# Patient Record
Sex: MALE | Race: BLACK OR AFRICAN AMERICAN
[De-identification: names, ages, dates, MRNs, and addresses within clinical notes are randomized per-mention and may not be internally consistent; named-entity substitution may affect disease eponyms.]

---

## 2020-11-24 ENCOUNTER — HOSPITAL ENCOUNTER (OUTPATIENT)
Dept: HOSPITAL 92 - BICRAD | Age: 55
Discharge: HOME | End: 2020-11-24
Payer: COMMERCIAL

## 2020-11-24 DIAGNOSIS — Z02.71: Primary | ICD-10-CM

## 2020-11-24 PROCEDURE — 71046 X-RAY EXAM CHEST 2 VIEWS: CPT

## 2020-11-24 NOTE — RAD
XR Chest Pa   Lat STANDARD



HISTORY: Encounter for disability determination



COMPARISON: None



FINDINGS: The heart size is normal. The lungs are well expanded without focal areas of consolidation,
 pneumothorax or pleural effusions. There are degenerative changes in the spine.



IMPRESSION: No radiographic evidence of acute cardiopulmonary process.



Reported By: Duane Dukes 

Electronically Signed:  11/24/2020 1:08 PM

## 2022-08-05 ENCOUNTER — HOSPITAL ENCOUNTER (EMERGENCY)
Dept: HOSPITAL 92 - CSHERS | Age: 57
Discharge: HOME | End: 2022-08-05
Payer: COMMERCIAL

## 2022-08-05 DIAGNOSIS — J45.909: ICD-10-CM

## 2022-08-05 DIAGNOSIS — E11.40: ICD-10-CM

## 2022-08-05 DIAGNOSIS — E78.5: ICD-10-CM

## 2022-08-05 DIAGNOSIS — F17.220: ICD-10-CM

## 2022-08-05 DIAGNOSIS — M25.511: ICD-10-CM

## 2022-08-05 DIAGNOSIS — S09.90XA: Primary | ICD-10-CM

## 2022-08-05 DIAGNOSIS — S20.211A: ICD-10-CM

## 2022-08-05 DIAGNOSIS — I10: ICD-10-CM
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
## 2022-08-05 PROCEDURE — 96372 THER/PROPH/DIAG INJ SC/IM: CPT

## 2022-08-05 PROCEDURE — 71046 X-RAY EXAM CHEST 2 VIEWS: CPT
